# Patient Record
Sex: FEMALE | NOT HISPANIC OR LATINO | URBAN - METROPOLITAN AREA
[De-identification: names, ages, dates, MRNs, and addresses within clinical notes are randomized per-mention and may not be internally consistent; named-entity substitution may affect disease eponyms.]

---

## 2017-07-26 ENCOUNTER — IMPORTED ENCOUNTER (OUTPATIENT)
Dept: URBAN - METROPOLITAN AREA CLINIC 1 | Facility: CLINIC | Age: 47
End: 2017-07-26

## 2017-07-26 PROBLEM — H40.013: Noted: 2017-07-26

## 2017-07-26 PROCEDURE — 99213 OFFICE O/P EST LOW 20 MIN: CPT

## 2017-07-26 PROCEDURE — 92133 CPTRZD OPH DX IMG PST SGM ON: CPT

## 2017-07-26 NOTE — PATIENT DISCUSSION
1. COAG Suspect OU: (0.30/0.25) IOP was 20/20. Condition was discussed with patient. Will monitor patient for progression. OCT was done today results was normal OU2. Return for an appointment in 2 months for 30 Contact lens check and VF 24-2 with Dr. Judy Renae.

## 2017-09-25 ENCOUNTER — IMPORTED ENCOUNTER (OUTPATIENT)
Dept: URBAN - METROPOLITAN AREA CLINIC 1 | Facility: CLINIC | Age: 47
End: 2017-09-25

## 2017-09-25 PROBLEM — H40.013: Noted: 2017-09-25

## 2017-09-25 PROBLEM — H04.123: Noted: 2017-09-25

## 2017-09-25 PROCEDURE — 92015 DETERMINE REFRACTIVE STATE: CPT

## 2017-09-25 PROCEDURE — 92083 EXTENDED VISUAL FIELD XM: CPT

## 2017-09-25 PROCEDURE — 92014 COMPRE OPH EXAM EST PT 1/>: CPT

## 2017-09-25 NOTE — PATIENT DISCUSSION
1. COAG Suspect OU: (0.30/0.25)  IOP was 20/19. Condition was discussed with patient. Will monitor patient for progression. VF was done today results was superior nasal step non specific loss OS WNL OU.2. Dry Eyes OU -- Recommended to patient to use Artificial Tears QID OU-sample of Refresh given 3. Return for an appointment in 1 month for 10/k-check with Dr. Lamar Diaz.

## 2017-12-04 ENCOUNTER — IMPORTED ENCOUNTER (OUTPATIENT)
Dept: URBAN - METROPOLITAN AREA CLINIC 1 | Facility: CLINIC | Age: 47
End: 2017-12-04

## 2017-12-04 PROBLEM — H04.123: Noted: 2017-12-04

## 2017-12-04 PROBLEM — H40.053: Noted: 2017-12-04

## 2017-12-04 PROBLEM — H40.013: Noted: 2017-12-04

## 2017-12-04 PROCEDURE — 83861 MICROFLUID ANALY TEARS: CPT

## 2017-12-04 PROCEDURE — 99213 OFFICE O/P EST LOW 20 MIN: CPT

## 2017-12-04 NOTE — PATIENT DISCUSSION
1.  Dry Eyes OU -- Tear Lab 308/295. Recommended to patient to use Artificial Tears BID OU2. Glaucoma Suspect OU/OHTN OU (CD 0.30/0.25) : IOP stable 19 OU. Negative family hx. Return for an appointment in July 30/OCT with Dr. Ni Spears.

## 2018-05-08 NOTE — PATIENT DISCUSSION
OS 1st. Sym OU Goal: OD: TBD OS: kodak vs CV OU Goal OD: kodak vs -1.50 to -1.75 OS: kodak vs B+ kodak OU.

## 2018-07-11 ENCOUNTER — IMPORTED ENCOUNTER (OUTPATIENT)
Dept: URBAN - METROPOLITAN AREA CLINIC 1 | Facility: CLINIC | Age: 48
End: 2018-07-11

## 2018-07-11 PROBLEM — H40.013: Noted: 2018-07-11

## 2018-07-11 PROBLEM — H04.123: Noted: 2018-07-11

## 2018-07-11 PROBLEM — H40.053: Noted: 2018-07-11

## 2018-07-11 PROCEDURE — 92014 COMPRE OPH EXAM EST PT 1/>: CPT

## 2018-07-11 PROCEDURE — 92133 CPTRZD OPH DX IMG PST SGM ON: CPT

## 2018-07-11 PROCEDURE — 92015 DETERMINE REFRACTIVE STATE: CPT

## 2018-07-11 NOTE — PATIENT DISCUSSION
1.  Glaucoma Suspect / OHTN OU (CD: 0.30 / 0.25) -- IOP stable at 20 OU today. Negative family hx. OCT today shows WNL OU. Patient is considered Low Risk. Condition was discussed with patient and patient understands. Will continue to monitor patient for any progression in condition. Patient was advised to call us with any problems questions or concerns. Observe for changes/progression. Patient to continue with current treatment regimen. 2. Dry Eyes OU -- Recommend the frequent use of OTC AT's BID-QID OU 3. Corneal Dellen OD4. Vitreous Strands OUFinalized Glasses MRx & given to patient. Return for an appointment in 1 YR for a 27 / OCT OU with Dr. Yan Gtz. Return for an appointment in 6 MO for a 40 / RGP CC OU with Dr. Yan Gtz.

## 2019-01-11 ENCOUNTER — IMPORTED ENCOUNTER (OUTPATIENT)
Dept: URBAN - METROPOLITAN AREA CLINIC 1 | Facility: CLINIC | Age: 49
End: 2019-01-11

## 2019-01-11 PROBLEM — H52.4: Noted: 2019-01-11

## 2019-01-11 PROCEDURE — S0621 ROUTINE OPHTHALMOLOGICAL EXA: HCPCS

## 2019-01-11 NOTE — PATIENT DISCUSSION
1.  Presbyopia: Rx was given for corrective spectacles if indicated. 2.  Glaucoma Suspect / OHTN OU (CD: 0.30 / 0.25)  3. Dry Eyes OU -- Recommend the frequent use of OTC AT's BID-QID OU 4. Corneal Scar OU5. Vitreous Strands OUWill reorder P CTLs monovision (OD Near OS Distance) --Will Change OS to -1.50 Sph Return for an appointment in [facility] CC after  with Dr. Erica Corea.

## 2019-01-11 NOTE — PATIENT DISCUSSION
Glaucoma Suspect / OHTN OU (CD: 0.30 / 0.25)  3. Dry Eyes OU -- Recommend the frequent use of OTC AT's BID-QID OU 4. Corneal Scar OU5. Vitreous Strands OUWill reorder RGP CTLs monovision (OD Near OS Distance)Return for an appointment in R[facility] CC after  with Dr. Radha Varela.

## 2019-02-11 ENCOUNTER — IMPORTED ENCOUNTER (OUTPATIENT)
Dept: URBAN - METROPOLITAN AREA CLINIC 1 | Facility: CLINIC | Age: 49
End: 2019-02-11

## 2020-10-29 ENCOUNTER — IMPORTED ENCOUNTER (OUTPATIENT)
Dept: URBAN - METROPOLITAN AREA CLINIC 1 | Facility: CLINIC | Age: 50
End: 2020-10-29

## 2020-10-29 PROBLEM — H52.4: Noted: 2020-10-29

## 2020-10-29 PROBLEM — H52.11: Noted: 2020-10-29

## 2020-10-29 PROCEDURE — S0621 ROUTINE OPHTHALMOLOGICAL EXA: HCPCS

## 2020-10-29 NOTE — PATIENT DISCUSSION
1. Myopia OD: Rx was given for correction if indicated and requested. 2. Presbyopia OU3. Glaucoma Suspect / OHTN OU (CD: 0.30 / 0.25)  - IOP was 21/18 today. Will plan to repeat OCT OOU at next visit. 4. Dry Eyes OU -- Recommend the frequent use of OTC AT's BID-QID OU 5. Nuclear Cataract OU - observe. 6. Corneal Scar OU - stable OU. observe. 7. Vitreous Strands OUPt good w/ current RGP CTLs (monovision (OD Near OS Distance)) and will defer fitting today. WIll plan on checking RGP CTLs next year. Return for an appointment in 6 months 30/OCT with Dr. Mariya Angela. Return for an appointment in 1 year 40/cc with Dr. Mariya Angela.

## 2021-04-29 ENCOUNTER — IMPORTED ENCOUNTER (OUTPATIENT)
Dept: URBAN - METROPOLITAN AREA CLINIC 1 | Facility: CLINIC | Age: 51
End: 2021-04-29

## 2021-04-29 PROBLEM — H40.013: Noted: 2021-04-29

## 2021-04-29 PROBLEM — H25.13: Noted: 2021-04-29

## 2021-04-29 PROBLEM — H04.123: Noted: 2021-04-29

## 2021-04-29 PROBLEM — H17.823: Noted: 2021-04-29

## 2021-04-29 PROBLEM — H40.053: Noted: 2021-04-29

## 2021-04-29 PROCEDURE — 92014 COMPRE OPH EXAM EST PT 1/>: CPT

## 2021-04-29 PROCEDURE — 92133 CPTRZD OPH DX IMG PST SGM ON: CPT

## 2021-04-29 NOTE — PATIENT DISCUSSION
1.  OHTN/Glaucoma Suspect OU (Low Risk) -- (CD: 0.30/0.25) OCT shows no progression OU stable. IOP stable at 20 OU. T-Max 21/20. (-) Family Hx. Condition was discussed with patient and patient understands. Will continue to monitor patient for any progression in condition. Patient was advised to call us with any problems questions or concerns. **Testing to be done every other year per [facility]**2. Nuclear Cataract OU -- Mild progression OU. Non-surgical at this time continue to monitor for progression. The patient was advised to contact us if any change or worsening of vision. 3. Dry Eyes OU -- Cont ATs BID OU routinely. 4.  Peripheral Corneal Scar OU -- Stable. Observe. 5.  Vitreous Strands OU -- Stable. RD Precautions. 6.  RGP CC -- returns under vision. Patient deferred MRx returns under vision plan. Return for an appointment as scheduled (10.29.2021 - 40/RGP cc) with Dr. Tom. Return for an appointment in 1 year 30 (*No testing*) with Dr. Tom.

## 2021-10-29 ENCOUNTER — IMPORTED ENCOUNTER (OUTPATIENT)
Dept: URBAN - METROPOLITAN AREA CLINIC 1 | Facility: CLINIC | Age: 51
End: 2021-10-29

## 2021-10-29 PROBLEM — H52.4: Noted: 2021-10-29

## 2021-10-29 PROBLEM — H52.223: Noted: 2021-10-29

## 2021-10-29 PROCEDURE — S0621 ROUTINE OPHTHALMOLOGICAL EXA: HCPCS

## 2021-10-29 NOTE — PATIENT DISCUSSION
1.  Astigmatism OU -- Rx was given for correction if indicated and requested. 2. Presbyopia3. NS Cataract OU -- Observe. 4.  Dry Eyes OU -- Cont ATs BID OU routinely. 5.  Peripheral Corneal Scar OU -- Stable. Observe. 6.  OHTN/Glaucoma Suspect OU -- (CD: 0.30/0.25) IOP stable. T-Max 21/20. (-) Family Hx. Cont to monitor patient for any progression in condition. Patient was advised to call us with any problems questions or concerns. **Testing to be done every other year per [facility]**7.  Vitreous Strands OU -- Stable. RD Precautions. RGP wearer - Patient happy with va in current CTLs- [place] OD Near OS Distance. Mild deposits on RGPs today. Yeyo done today and will plan to order new RGP lenses.

## 2021-12-06 ENCOUNTER — IMPORTED ENCOUNTER (OUTPATIENT)
Dept: URBAN - METROPOLITAN AREA CLINIC 1 | Facility: CLINIC | Age: 51
End: 2021-12-06

## 2021-12-06 NOTE — PATIENT DISCUSSION
1.  RGP CC today -- patient happy with distance vision but not the near vision comfort good. RBF to order new lens. Return for an appointment in 1 week cc after RGP lenses arrive with Dr. Jeni Faye.

## 2022-04-02 ASSESSMENT — VISUAL ACUITY
OS_SC: 20/20
OS_SC: 20/20
OU_CC: J2
OD_CC: J1
OS_CC: J1
OU_CC: J1+
OD_CC: J1
OD_CC: J1
OD_SC: 20/50
OS_SC: 20/20
OD_SC: 20/20
OD_SC: 20/20
OD_CC: J1
OS_SC: 20/20-1
OS_SC: 20/20
OS_SC: 20/25
OD_SC: 20/20-1
OS_SC: 20/20
OD_SC: 20/30-2
OD_CC: J1
OS_SC: 20/20-2
OD_CC: J1
OS_SC: 20/20
OD_SC: 20/20
OS_SC: 20/20
OD_SC: 20/25

## 2022-04-02 ASSESSMENT — TONOMETRY
OD_IOP_MMHG: 20
OS_IOP_MMHG: 20
OS_IOP_MMHG: 20
OD_IOP_MMHG: 21
OS_IOP_MMHG: 18
OD_IOP_MMHG: 20
OS_IOP_MMHG: 19
OD_IOP_MMHG: 20
OD_IOP_MMHG: 19
OD_IOP_MMHG: 20
OS_IOP_MMHG: 19
OD_IOP_MMHG: 20
OS_IOP_MMHG: 18
OS_IOP_MMHG: 20
OD_IOP_MMHG: 16
OS_IOP_MMHG: 18

## 2022-09-21 NOTE — PATIENT DISCUSSION
Recommended surgery.  Prior to having retinal surgery recommend patient have cataract surgery to allow for more complete PPV/MP.  Patient to have cataract surgery in 1-2 weeks, Follow up with Dr. Bernabe Hoskins in 5 weeks to further discuss and schedule retinal surgery.

## 2022-09-21 NOTE — PATIENT DISCUSSION
Stable, We reviewed the signs and symptoms of retinal tear/retinal detachment and the importance of prompt evaluation should there be increasing floaters, new flashing lights, or decreasing peripheral vision in either eye at any time.

## 2022-10-10 NOTE — PATIENT DISCUSSION
The patient feels that the cataract is significantly impacting daily activities and has elected cataract surgery. The risks, benefits, and alternatives to surgery were discussed. The patient states she is NOT READY to proceed with sx.

## 2022-10-10 NOTE — PATIENT DISCUSSION
Recommended observation with Dr Chayo Lucia. Pt claims she did not have enough info from Dr Chayo Lucia and feels she can wait on retina sx.

## 2022-10-10 NOTE — PATIENT DISCUSSION
Recommended surgery.  Prior to having retinal surgery recommend patient have cataract surgery to allow for more complete PPV/MP.  Patient to have cataract surgery in 1-2 weeks, Follow up with Dr. Iraj Galvez in 5 weeks to further discuss and schedule retinal surgery.

## 2022-10-10 NOTE — PATIENT DISCUSSION
Long discussion about retina hole and [facility]  limitations. Recom.  fallow with Dr Chayo Lucia if pt declines cat. sx .

## 2022-10-10 NOTE — PATIENT DISCUSSION
Plan: CE IOL OD only  (will need  Mac. hole repair with Dr Nader Fernández B/B+/ CV ARI only ( due retina). Pt is leaning towards Basic IOL. [place] . May need CFS transportation. Pt decided to wait due to her personal schedule and will call to schedule cat. sx when ready.

## 2022-10-10 NOTE — PATIENT DISCUSSION
Long Disc. with pt Mac. hole and cataract/IOL   visual potentials. was advised to proceed with cat. sx. and its connection with retina sx. Pt elects to think about it call when ready.

## 2022-10-10 NOTE — PATIENT DISCUSSION
The patient feels that the cataract is significantly impacting daily activities and has elected cataract surgery. The risks, benefits, and alternatives to surgery were discussed. The patient elects NOT to proceed with surgery at this time due to [facility]  schedule. Pt states she will call and schedule when ready.

## 2023-07-27 ENCOUNTER — COMPREHENSIVE EXAM (OUTPATIENT)
Dept: URBAN - METROPOLITAN AREA CLINIC 1 | Facility: CLINIC | Age: 53
End: 2023-07-27

## 2023-07-27 DIAGNOSIS — H40.013: ICD-10-CM

## 2023-07-27 DIAGNOSIS — E11.9: ICD-10-CM

## 2023-07-27 PROCEDURE — 92014 COMPRE OPH EXAM EST PT 1/>: CPT

## 2023-07-27 PROCEDURE — 92133 CPTRZD OPH DX IMG PST SGM ON: CPT

## 2023-07-27 ASSESSMENT — VISUAL ACUITY
OU_CC: 20/20
OD_CC: J1
OD_CC: 20/20
OS_CC: J4
OS_CC: 20/20
OU_CC: J1+

## 2023-07-27 ASSESSMENT — KERATOMETRY
OS_AXISANGLE_DEGREES: 100
OD_AXISANGLE2_DEGREES: 166
OS_K2POWER_DIOPTERS: 44.25
OD_K1POWER_DIOPTERS: 44.75
OS_K1POWER_DIOPTERS: 43.25
OD_AXISANGLE_DEGREES: 076
OD_K2POWER_DIOPTERS: 45.25
OS_AXISANGLE2_DEGREES: 10

## 2023-07-27 ASSESSMENT — TONOMETRY
OD_IOP_MMHG: 18
OS_IOP_MMHG: 18

## 2023-09-06 ENCOUNTER — COMPREHENSIVE EXAM (OUTPATIENT)
Dept: URBAN - METROPOLITAN AREA CLINIC 1 | Facility: CLINIC | Age: 53
End: 2023-09-06

## 2023-09-06 DIAGNOSIS — Z46.0: ICD-10-CM

## 2023-09-06 DIAGNOSIS — Z01.00: ICD-10-CM

## 2023-09-06 PROCEDURE — 92014 COMPRE OPH EXAM EST PT 1/>: CPT

## 2023-09-06 PROCEDURE — 92015 DETERMINE REFRACTIVE STATE: CPT

## 2023-09-06 PROCEDURE — 92310-E CONTACT LENS FITTING ESTABLISH PATIENT

## 2023-09-06 ASSESSMENT — KERATOMETRY
OD_AXISANGLE_DEGREES: 076
OD_K1POWER_DIOPTERS: 44.75
OD_AXISANGLE2_DEGREES: 166
OS_K2POWER_DIOPTERS: 44.25
OS_AXISANGLE_DEGREES: 100
OD_K2POWER_DIOPTERS: 45.25
OS_AXISANGLE2_DEGREES: 10
OS_K1POWER_DIOPTERS: 43.25

## 2023-09-06 ASSESSMENT — VISUAL ACUITY
OS_CC: 20/20-2
OD_CC: 20/25-1
OS_CC: J1
OD_CC: J1

## 2023-09-06 ASSESSMENT — TONOMETRY
OS_IOP_MMHG: 15
OD_IOP_MMHG: 15

## 2025-05-12 ENCOUNTER — COMPREHENSIVE EXAM (OUTPATIENT)
Age: 55
End: 2025-05-12

## 2025-05-12 DIAGNOSIS — H52.13: ICD-10-CM

## 2025-05-12 DIAGNOSIS — Z01.00: ICD-10-CM

## 2025-05-12 DIAGNOSIS — H52.4: ICD-10-CM

## 2025-05-12 DIAGNOSIS — H52.223: ICD-10-CM

## 2025-05-12 PROCEDURE — 92014 COMPRE OPH EXAM EST PT 1/>: CPT

## 2025-05-12 PROCEDURE — 92015 DETERMINE REFRACTIVE STATE: CPT
